# Patient Record
(demographics unavailable — no encounter records)

---

## 2024-10-15 NOTE — PLAN
[TextEntry] : The patient was advised of the diagnosis. The natural history of the pathology was explained in full to the patient in layman's terms. All questions were answered. The risks and benefits of surgical and non-surgical treatment alternatives were explained in full to the patient.  Pt reassured that he has no neurologic compression along femoral nerve. Lt hip and thoigh weakness persists  Patient was advised to continue with physical therapy.  Remains OOW for now.

## 2024-10-15 NOTE — PHYSICAL EXAM
[Normal Mood and Affect] : normal mood and affect [Able to Communicate] : able to communicate [Well Developed] : well developed [Well Nourished] : well nourished [Left] : left hip [NL (120)] : flexion 120 degrees [NL (35)] : adduction 35 degrees [NL (45)] : internal rotation 45 degrees [4___] : flexion 4[unfilled]/5 [5___] : abduction 5[unfilled]/5 [] : no anterior thigh tenderness [FreeTextEntry8] : anterior thigh tenderness [de-identified] : pain with resistance to hip flexion and knee extension. 4/5 weakness against resistance

## 2024-10-15 NOTE — HISTORY OF PRESENT ILLNESS
[8] : 8 [Dull/Aching] : dull/aching [Sharp] : sharp [Shooting] : shooting [Throbbing] : throbbing [Constant] : constant [Sleep] : sleep [Walking] : walking [Lying in bed] : lying in bed [Not working due to injury] : Work status: not working due to injury [6] : 6 [5] : 5 [de-identified] : 10/15/24:  Returns today for left lower extremity EMG results. Still feels weakness and pain lt anterior thigh. Cannot get up from a squat without his lt leg buckling. uses a cane for balance. Still OOW.  IMPRESSION: 1.  No evidence of large fiber polyneuropathy, left lumbar/bilateral lumbosacral radiculopathy at this time.  2.  Recommend followup study if clinically indicated.  10/04/24:  Returns today now 4 weeks since his injury. Lt hip improving but continues to c/o pain and buckling lt thigh. uses a cane for support. Still OOW. In PT 1x/week and does a HEP daily.  9/20/24  Returns today still c/o anterior lt thigh and hip pain. Still limping. Ambulating with a cane. Starting PT next week. Here for MRI lt thigh results.  Impression: 1.  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 2.  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 There is no evidence of a muscle tear. 3.  Limited evaluation of the knee demonstrates patellofemoral cartilage loss and a joint effusion.  09/16/24:  Returns today for continued left hip and thigh pain six days after injection. Pain is now more anterior thigh pain radiating down to lt calf. Worse walking and standing. Better sitting. using a cane to ambulate.  9/10/24   Return visit for this 63 year old male  who was walking to the parking lot at work and leg gave out last Friday 9/6/24. C/o acute pain and began limping. Went to [facility] Saturday 9/7/24, told him that he had a calcium deposit, Sent him for MRI at [facility]. denies any specific injury/trauma. Any movement is very uncomfortable for the patient. Tried Flexeril but has found no relief. Also given tramadol 50mg prn pain with no relief. Has been OOW since.  PMH: No prior hip pain.  IMPRESSION:  [facility] 09/09/24 LEFT HIP Mild left hip osteoarthritis.  Moderate insertional gluteal tendinosis with bursitis.  [] : no [FreeTextEntry1] : LT hip [FreeTextEntry7] : into the L thigh [de-identified] : sit to stand [de-identified] : 10/4/24 [de-identified] : [facility] [de-identified] : X-rays and MRI, [de-identified] : PT

## 2024-10-15 NOTE — HISTORY OF PRESENT ILLNESS
[8] : 8 [Dull/Aching] : dull/aching [Sharp] : sharp [Shooting] : shooting [Throbbing] : throbbing [Constant] : constant [Sleep] : sleep [Walking] : walking [Lying in bed] : lying in bed [Not working due to injury] : Work status: not working due to injury [6] : 6 [5] : 5 [de-identified] : 10/15/24:  Returns today for left lower extremity EMG results. Still feels weakness and pain lt anterior thigh. Cannot get up from a squat without his lt leg buckling. uses a cane for balance. Still OOW.  IMPRESSION: 1.  No evidence of large fiber polyneuropathy, left lumbar/bilateral lumbosacral radiculopathy at this time.  2.  Recommend followup study if clinically indicated.  10/04/24:  Returns today now 4 weeks since his injury. Lt hip improving but continues to c/o pain and buckling lt thigh. uses a cane for support. Still OOW. In PT 1x/week and does a HEP daily.  9/20/24  Returns today still c/o anterior lt thigh and hip pain. Still limping. Ambulating with a cane. Starting PT next week. Here for MRI lt thigh results.  Impression: 1.  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 2.  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 There is no evidence of a muscle tear. 3.  Limited evaluation of the knee demonstrates patellofemoral cartilage loss and a joint effusion.  09/16/24:  Returns today for continued left hip and thigh pain six days after injection. Pain is now more anterior thigh pain radiating down to lt calf. Worse walking and standing. Better sitting. using a cane to ambulate.  9/10/24   Return visit for this 63 year old male  who was walking to the parking lot at work and leg gave out last Friday 9/6/24. C/o acute pain and began limping. Went to [facility] Saturday 9/7/24, told him that he had a calcium deposit, Sent him for MRI at [facility]. denies any specific injury/trauma. Any movement is very uncomfortable for the patient. Tried Flexeril but has found no relief. Also given tramadol 50mg prn pain with no relief. Has been OOW since.  PMH: No prior hip pain.  IMPRESSION:  [facility] 09/09/24 LEFT HIP Mild left hip osteoarthritis.  Moderate insertional gluteal tendinosis with bursitis.  [] : no [FreeTextEntry1] : LT hip [FreeTextEntry7] : into the L thigh [de-identified] : sit to stand [de-identified] : 10/4/24 [de-identified] : [facility] [de-identified] : X-rays and MRI, [de-identified] : PT

## 2024-10-15 NOTE — PHYSICAL EXAM
[Normal Mood and Affect] : normal mood and affect [Able to Communicate] : able to communicate [Well Developed] : well developed [Well Nourished] : well nourished [Left] : left hip [NL (120)] : flexion 120 degrees [NL (35)] : adduction 35 degrees [NL (45)] : internal rotation 45 degrees [4___] : flexion 4[unfilled]/5 [5___] : abduction 5[unfilled]/5 [] : no anterior thigh tenderness [FreeTextEntry8] : anterior thigh tenderness [de-identified] : pain with resistance to hip flexion and knee extension. 4/5 weakness against resistance

## 2024-11-18 NOTE — PHYSICAL EXAM
[Normal Mood and Affect] : normal mood and affect [Able to Communicate] : able to communicate [Well Developed] : well developed [Well Nourished] : well nourished [NL (120)] : flexion 120 degrees [NL (35)] : adduction 35 degrees [NL (45)] : internal rotation 45 degrees [4___] : flexion 4[unfilled]/5 [Left] : left knee [NL (140)] : flexion 140 degrees [NL (0)] : extension 0 degrees [5___] : quadriceps 5[unfilled]/5 [FreeTextEntry8] : anterior thigh tenderness [de-identified] : pain with resistance to hip flexion and knee extension. 4/5 weakness against resistance [] : no quadriceps tendon tenderness

## 2024-11-18 NOTE — PLAN
[TextEntry] : The patient was advised of the diagnosis. The natural history of the pathology was explained in full to the patient in layman's terms. All questions were answered. The risks and benefits of surgical and non-surgical treatment alternatives were explained in full to the patient.  Patient may weightbear as tolerated.  Patient was advised to continue with physical therapy.  Remains OOW , totally disabled fornow  May benefit from vocational retraining.

## 2024-11-18 NOTE — HISTORY OF PRESENT ILLNESS
[6] : 6 [5] : 5 [Dull/Aching] : dull/aching [Sharp] : sharp [Shooting] : shooting [Throbbing] : throbbing [Constant] : constant [Sleep] : sleep [Walking] : walking [Lying in bed] : lying in bed [Not working due to injury] : Work status: not working due to injury [de-identified] : 11/18/24:  Patient returns today .Now 2 months since his injury. Still feeling weakness lt quads. using a cane. In PT1x/week and doing HEP 4-5x/week. Still OOW and was let go from his job.  10/15/24:  Returns today for left lower extremity EMG results. Still feels weakness and pain lt anterior thigh. Cannot get up from a squat without his lt leg buckling. uses a cane for balance. Still OOW.  IMPRESSION: 1.  No evidence of large fiber polyneuropathy, left lumbar/bilateral lumbosacral radiculopathy at this time.  2.  Recommend followup study if clinically indicated.  10/04/24:  Returns today now 4 weeks since his injury. Lt hip improving but continues to c/o pain and buckling lt thigh. uses a cane for support. Still OOW. In PT 1x/week and does a HEP daily.  9/20/24  Returns today still c/o anterior lt thigh and hip pain. Still limping. Ambulating with a cane. Starting PT next week. Here for MRI lt thigh results.  Impression: 1.  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 2.  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 There is no evidence of a muscle tear. 3.  Limited evaluation of the knee demonstrates patellofemoral cartilage loss and a joint effusion.  09/16/24:  Returns today for continued left hip and thigh pain six days after injection. Pain is now more anterior thigh pain radiating down to lt calf. Worse walking and standing. Better sitting. using a cane to ambulate.  9/10/24   Return visit for this 63 year old male  who was walking to the parking lot at work and leg gave out last Friday 9/6/24. C/o acute pain and began limping. Went to [facility] Saturday 9/7/24, told him that he had a calcium deposit, Sent him for MRI at [facility]. denies any specific injury/trauma. Any movement is very uncomfortable for the patient. Tried Flexeril but has found no relief. Also given tramadol 50mg prn pain with no relief. Has been OOW since.  PMH: No prior hip pain.  IMPRESSION:  [facility] 09/09/24 LEFT HIP Mild left hip osteoarthritis.  Moderate insertional gluteal tendinosis with bursitis.  [] : no [FreeTextEntry1] : LT hip [FreeTextEntry5] : Reports LT high and LT knee pain for 2 weeks.  [FreeTextEntry7] : into the L thigh [de-identified] : sit to stand [de-identified] : 10/4/24 [de-identified] : [facility] [de-identified] : X-rays and MRI, [de-identified] : PT

## 2024-11-18 NOTE — PHYSICAL EXAM
[Normal Mood and Affect] : normal mood and affect [Able to Communicate] : able to communicate [Well Developed] : well developed [Well Nourished] : well nourished [NL (120)] : flexion 120 degrees [NL (35)] : adduction 35 degrees [NL (45)] : internal rotation 45 degrees [4___] : flexion 4[unfilled]/5 [Left] : left knee [NL (140)] : flexion 140 degrees [NL (0)] : extension 0 degrees [5___] : quadriceps 5[unfilled]/5 [FreeTextEntry8] : anterior thigh tenderness [de-identified] : pain with resistance to hip flexion and knee extension. 4/5 weakness against resistance [] : no quadriceps tendon tenderness

## 2024-11-18 NOTE — HISTORY OF PRESENT ILLNESS
[6] : 6 [5] : 5 [Dull/Aching] : dull/aching [Sharp] : sharp [Shooting] : shooting [Throbbing] : throbbing [Constant] : constant [Sleep] : sleep [Walking] : walking [Lying in bed] : lying in bed [Not working due to injury] : Work status: not working due to injury [de-identified] : 11/18/24:  Patient returns today .Now 2 months since his injury. Still feeling weakness lt quads. using a cane. In PT1x/week and doing HEP 4-5x/week. Still OOW and was let go from his job.  10/15/24:  Returns today for left lower extremity EMG results. Still feels weakness and pain lt anterior thigh. Cannot get up from a squat without his lt leg buckling. uses a cane for balance. Still OOW.  IMPRESSION: 1.  No evidence of large fiber polyneuropathy, left lumbar/bilateral lumbosacral radiculopathy at this time.  2.  Recommend followup study if clinically indicated.  10/04/24:  Returns today now 4 weeks since his injury. Lt hip improving but continues to c/o pain and buckling lt thigh. uses a cane for support. Still OOW. In PT 1x/week and does a HEP daily.  9/20/24  Returns today still c/o anterior lt thigh and hip pain. Still limping. Ambulating with a cane. Starting PT next week. Here for MRI lt thigh results.  Impression: 1.  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 2.  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 There is no evidence of a muscle tear. 3.  Limited evaluation of the knee demonstrates patellofemoral cartilage loss and a joint effusion.  09/16/24:  Returns today for continued left hip and thigh pain six days after injection. Pain is now more anterior thigh pain radiating down to lt calf. Worse walking and standing. Better sitting. using a cane to ambulate.  9/10/24   Return visit for this 63 year old male  who was walking to the parking lot at work and leg gave out last Friday 9/6/24. C/o acute pain and began limping. Went to [facility] Saturday 9/7/24, told him that he had a calcium deposit, Sent him for MRI at [facility]. denies any specific injury/trauma. Any movement is very uncomfortable for the patient. Tried Flexeril but has found no relief. Also given tramadol 50mg prn pain with no relief. Has been OOW since.  PMH: No prior hip pain.  IMPRESSION:  [facility] 09/09/24 LEFT HIP Mild left hip osteoarthritis.  Moderate insertional gluteal tendinosis with bursitis.  [] : no [FreeTextEntry1] : LT hip [FreeTextEntry5] : Reports LT high and LT knee pain for 2 weeks.  [FreeTextEntry7] : into the L thigh [de-identified] : sit to stand [de-identified] : 10/4/24 [de-identified] : [facility] [de-identified] : X-rays and MRI, [de-identified] : PT